# Patient Record
Sex: MALE | Race: WHITE | NOT HISPANIC OR LATINO | URBAN - METROPOLITAN AREA
[De-identification: names, ages, dates, MRNs, and addresses within clinical notes are randomized per-mention and may not be internally consistent; named-entity substitution may affect disease eponyms.]

---

## 2017-03-03 ENCOUNTER — ALLSCRIPTS OFFICE VISIT (OUTPATIENT)
Dept: OTHER | Facility: OTHER | Age: 62
End: 2017-03-03

## 2017-03-22 ENCOUNTER — DOCTOR'S OFFICE (OUTPATIENT)
Dept: URBAN - METROPOLITAN AREA CLINIC 136 | Facility: CLINIC | Age: 62
Setting detail: OPHTHALMOLOGY
End: 2017-03-22
Payer: COMMERCIAL

## 2017-03-22 DIAGNOSIS — H40.003: ICD-10-CM

## 2017-03-22 DIAGNOSIS — H35.361: ICD-10-CM

## 2017-03-22 DIAGNOSIS — Z96.1: ICD-10-CM

## 2017-03-22 PROCEDURE — 92014 COMPRE OPH EXAM EST PT 1/>: CPT | Performed by: OPTOMETRIST

## 2017-03-22 ASSESSMENT — REFRACTION_MANIFEST
OS_VA2: 20/
OD_VA2: 20/
OS_VA3: 20/
OS_VA3: 20/
OD_VA3: 20/
OD_VA2: 20/
OU_VA: 20/20
OS_VA1: 20/20
OD_AXIS: 160
OD_VA3: 20/
OD_SPHERE: +2.50
OS_CYLINDER: -0.50
OS_VA2: 20/
OD_CYLINDER: -0.50
OD_VA1: 20/
OS_AXIS: 060
OD_VA1: 20/20
OS_VA1: 20/
OS_SPHERE: +2.25
OU_VA: 20/

## 2017-03-22 ASSESSMENT — SUPERFICIAL PUNCTATE KERATITIS (SPK)
OS_SPK: 1+
OD_SPK: T

## 2017-03-22 ASSESSMENT — KERATOMETRY
METHOD_AUTO_MANUAL: MANUAL
OS_K2POWER_DIOPTERS: 44.50
OD_K1POWER_DIOPTERS: 43.50
OS_K1POWER_DIOPTERS: 43.75
OS_AXISANGLE_DEGREES: 020
OD_AXISANGLE_DEGREES: 010
OD_K2POWER_DIOPTERS: 44.50

## 2017-03-22 ASSESSMENT — SPHEQUIV_DERIVED
OD_SPHEQUIV: 2.25
OS_SPHEQUIV: 2
OS_SPHEQUIV: 0

## 2017-03-22 ASSESSMENT — REFRACTION_AUTOREFRACTION
OD_SPHERE: PLANO
OD_CYLINDER: -0.25
OS_SPHERE: +0.25
OS_AXIS: 033
OD_AXIS: 147
OS_CYLINDER: -0.50

## 2017-03-22 ASSESSMENT — REFRACTION_OUTSIDERX
OS_VA1: 20/20
OD_AXIS: 165
OS_SPHERE: +0.25
OD_VA3: 20/
OS_VA3: 20/
OD_CYLINDER: -0.50
OU_VA: 20/20
OD_ADD: +PLANO
OD_VA2: 20/20
OS_VA2: 20/20
OD_SPHERE: +0.25
OS_AXIS: 030
OS_ADD: +PLANO
OD_VA1: 20/20
OS_CYLINDER: -0.50

## 2017-03-22 ASSESSMENT — REFRACTION_CURRENTRX
OD_OVR_VA: 20/
OD_ADD: +2.00
OS_ADD: +2.00
OS_OVR_VA: 20/
OS_SPHERE: +1.50
OD_CYLINDER: -0.25
OS_OVR_VA: 20/
OD_AXIS: 071
OS_OVR_VA: 20/
OS_CYLINDER: -0.25
OD_OVR_VA: 20/
OD_OVR_VA: 20/
OS_AXIS: 050
OD_SPHERE: +1.75

## 2017-03-22 ASSESSMENT — LID EXAM ASSESSMENTS
OS_MEIBOMITIS: 2+
OD_MEIBOMITIS: 2+

## 2017-03-22 ASSESSMENT — AXIALLENGTH_DERIVED
OD_AL: 22.5772
OS_AL: 23.3645
OS_AL: 22.6245

## 2017-03-22 ASSESSMENT — CONFRONTATIONAL VISUAL FIELD TEST (CVF)
OD_FINDINGS: FULL
OS_FINDINGS: FULL

## 2017-03-22 ASSESSMENT — VISUAL ACUITY
OD_BCVA: 20/20-
OS_BCVA: 20/20-

## 2017-03-22 ASSESSMENT — TEAR BREAK UP TIME (TBUT)
OS_TBUT: 1+
OD_TBUT: 1+

## 2017-03-22 ASSESSMENT — DECREASING TEAR LAKE - SEVERITY SCORE
OS_DEC_TEARLAKE: 1+
OD_DEC_TEARLAKE: 1+

## 2017-08-18 ENCOUNTER — ALLSCRIPTS OFFICE VISIT (OUTPATIENT)
Dept: OTHER | Facility: OTHER | Age: 62
End: 2017-08-18

## 2017-10-06 ENCOUNTER — ALLSCRIPTS OFFICE VISIT (OUTPATIENT)
Dept: OTHER | Facility: OTHER | Age: 62
End: 2017-10-06

## 2018-01-11 NOTE — PSYCH
Psych Med Mgmt    Appearance: was calm and cooperative  Observed mood: mood appropriate  Observed mood: affect appropriate  Speech: a normal rate  Thought processes: coherent/organized  Hallucinations: no hallucinations present  Thought Content: no delusions  Abnormal Thoughts: The patient has no suicidal thoughts and no homicidal thoughts  Orientation: The patient is oriented to person, place and time  Recent and Remote Memory: short term memory intact and long term memory intact  Treatment Recommendations: Zoloft 100 mg po qd    Letter sent to   Risks, Benefits And Possible Side Effects Of Medications: Risks, benefits, and possible side effects of medications explained to patient and patient verbalizes understanding  He reports normal appetite, normal energy level, no weight change and normal number of sleep hours  Pt seen for follow up MDD  Pt states he is dong well and had eye surgery last fall and can see well  He also had knee replacement and and recuperating well from that  His mother unfortunately had a fall and pelvic fracture and is in hospital currently  He has been not working but at [employer] and looking for work  Sleeping well and appetite is good  He is watching his diet and following a low carb diet to lose weight  He is doing well with that and plans to continue to lose weight  He is overall at his baseline with his moods  No side effects with meds  He is still unable to access internet due to his legal issues  He continues to go to therapy in [place]  Vitals  Signs   Recorded: 02Qhl9499 11:17AM   Respiration: 16  Height: 6 ft 5 in  Weight: 301 lb   BMI Calculated: 35 69  BSA Calculated: 2 66    DSM    Provisional Diagnosis: MDD  Assessment    1  Chronic recurrent major depressive disorder (296 30) (F33 9)    Review of Systems    Constitutional: No fever, no chills, feels well, no tiredness, no recent weight gain or loss  Cardiovascular: no complaints of slow or fast heart rate, no chest pain, no palpitations  Respiratory: no complaints of shortness of breath, no wheezing, no dyspnea on exertion  Gastrointestinal: no complaints of abdominal pain, no constipation, no nausea, no diarrhea, no vomiting  Genitourinary: no complaints of dysuria, no incontinence, no pelvic pain, no urinary frequency  Musculoskeletal: no complaints of arthralgia, no myalgias, no limb pain, no joint stiffness  Integumentary: no complaints of skin rash, no itching, no dry skin  Neurological: no complaints of headache, no confusion, no numbness, no dizziness  Active Problems    1  Chronic recurrent major depressive disorder (296 30) (F33 9)    Past Medical History    1  History of sleep apnea (V13 89) (Z86 69)    Allergies    1  No Known Drug Allergies    Current Meds   1  Pantoprazole Sodium 40 MG Oral Tablet Delayed Release; Therapy: (Recorded:06May2016) to Recorded   2  Sertraline HCl - 100 MG Oral Tablet; 1 TAB PO QD  Requested for: 33YZI5966; Last   Rx:[id number] Ordered    The medication list was reviewed and updated today  Family Psych History  Mother    1  No pertinent family history    Social History    · Lives with parents   · Never a smoker   · No alcohol use  The social history was reviewed and is unchanged  End of Encounter Meds    1  Sertraline HCl - 100 MG Oral Tablet; 1 TAB PO QD  Requested for: 31WRZ9120; Last   Rx:[id number] Ordered    2  Pantoprazole Sodium 40 MG Oral Tablet Delayed Release; Therapy: (Recorded:26Cjp1750) to Recorded    Signatures   Electronically signed by : Mini Brambila, Tabitha Bello;  Aug 18 2017 11:34AM EST                       (Author)    Electronically signed by : Jan Hale MD; Aug 21 2017  3:14PM EST

## 2018-01-12 NOTE — PSYCH
Psych Med Mgmt    Appearance: was calm and cooperative  Observed mood: mood appropriate  Observed mood: affect was constricted  Speech: a normal rate  Thought processes: coherent/organized  Hallucinations: no hallucinations present  Thought Content: no delusions  Abnormal Thoughts: The patient has no suicidal thoughts and no homicidal thoughts  Orientation: The patient is oriented to person, place and time  Recent and Remote Memory: short term memory intact and long term memory intact  Attention Span And Concentration: concentration intact  Insight: Insight intact  Treatment Recommendations: Zoloft 100 mg po qd  Risks, Benefits And Possible Side Effects Of Medications: Risks, benefits, and possible side effects of medications explained to patient and patient verbalizes understanding  He reports normal appetite, normal energy level, no weight change and normal number of sleep hours  Pt seen for follow up- he seems at his baseline  He states the last week has been difficult due to weather and his daughters 22nd birthday this week- he has not had contact with her since she was 12  His parents health has been deteriorating- his mother had a fall yesterday and had to get 10 stitches  His work has been stable- some stressors there but overall seems to be doing well  He is sleeping well and watching diet and has lost a few pounds  Vitals  Signs [Data Includes: Current Encounter]   Recorded: [id number] 11:51AM   Heart Rate: 64  Systolic: 038  Diastolic: 82  Recorded: 85PKB0353 11:49AM   Respiration: 16  Recorded: 27KXP2960 11:47AM   Height: 6 ft 6 in  Weight: 297 lb   BMI Calculated: 34 32  BSA Calculated: 2 67    DSM    Provisional Diagnosis: Chronic Major Recurrent Depression  Assessment    1  Chronic recurrent major depressive disorder (296 30) (F33 9)    Plan    1   Sertraline HCl - 100 MG Oral Tablet; 1 TAB PO QD    Review of Systems    Constitutional: No fever, no chills, feels well, no tiredness, no recent weight gain or loss  Cardiovascular: no complaints of slow or fast heart rate, no chest pain, no palpitations  Respiratory: no complaints of shortness of breath, no wheezing, no dyspnea on exertion  Gastrointestinal: no complaints of abdominal pain, no constipation, no nausea, no diarrhea, no vomiting  Genitourinary: no complaints of dysuria, no incontinence, no pelvic pain, no urinary frequency  Musculoskeletal: no complaints of arthralgia, no myalgias, no limb pain, no joint stiffness  Integumentary: no complaints of skin rash, no itching, no dry skin  Neurological: no complaints of headache, no confusion, no numbness, no dizziness  Active Problems    1  Chronic recurrent major depressive disorder (296 30) (F33 9)    Allergies    1  No Known Drug Allergies    Current Meds   1  Pantoprazole Sodium 40 MG Oral Tablet Delayed Release; Therapy: (Recorded:95Auc7439) to Recorded   2  Sertraline HCl - 100 MG Oral Tablet; 1 TAB PO QD  Requested for: 65WAZ1852; Last   Rx:[id number] Ordered    The medication list was reviewed and updated today  Family Psych History  Mother    1  No pertinent family history    The family history was reviewed and updated today  Social History    · Never a smoker   · No alcohol use  The social history was reviewed and updated today  The social history was reviewed and is unchanged  End of Encounter Meds    1  Sertraline HCl - 100 MG Oral Tablet; 1 TAB PO QD  Requested for: 44IUN8177; Last   DZ:[id number] Ordered    2  Pantoprazole Sodium 40 MG Oral Tablet Delayed Release;    Therapy: (Recorded:57Eby6658) to Recorded    Signatures   Electronically signed by : Bruce Garay, [facility]; May  6 2016 11:56AM EST                       (Author)    Electronically signed by : Pretty Foy MD; May  9 2016  5:08PM EST

## 2018-01-13 VITALS — RESPIRATION RATE: 16 BRPM | BODY MASS INDEX: 35.54 KG/M2 | WEIGHT: 301 LBS | HEIGHT: 77 IN

## 2018-01-13 NOTE — PSYCH
Psych Med Mgmt    Appearance: was calm and cooperative  Observed mood: mood appropriate  Observed mood: affect appropriate  Speech: a normal rate  Thought processes: coherent/organized  Hallucinations: no hallucinations present  Thought Content: no delusions  Abnormal Thoughts: The patient has no suicidal thoughts and no homicidal thoughts  Orientation: The patient is oriented to person, place and time  Recent and Remote Memory: short term memory intact and long term memory intact  Attention Span And Concentration: concentration intact  Insight: Insight intact  Judgment: His judgment was intact  Muscle Strength And Tone  Normal gait and station  Treatment Recommendations: Zoloft 100 mg po qd  Risks, Benefits And Possible Side Effects Of Medications: Risks, benefits, and possible side effects of medications explained to patient and patient verbalizes understanding  He reports normal appetite, normal energy level, no weight change and normal number of sleep hours  Pt states he has been doing well off Risperdal  Initially he had some dizziness of Risperdal but this resolved in 1 week  Work has been stable- he was working a lot of hours before holiday  Sleeping well  Appetite good  Stable relationship with his parents  Vitals  Signs [Data Includes: Current Encounter]   Recorded: 41OZW1486 11:45AM   Height: 6 ft 6 in  Weight: 308 lb   BMI Calculated: 35 59  BSA Calculated: 2 71    DSM    Provisional Diagnosis: Major Recurrent Depression  Assessment    1  Depression, major, recurrent (296 30) (F33 9)    Plan    1  RisperiDONE 0 5 MG Oral Tablet   2  Sertraline HCl - 100 MG Oral Tablet; 1 TAB PO QD    Review of Systems    Constitutional: No fever, no chills, feels well, no tiredness, no recent weight gain or loss  Cardiovascular: no complaints of slow or fast heart rate, no chest pain, no palpitations     Respiratory: no complaints of shortness of breath, no wheezing, no dyspnea on exertion  Gastrointestinal: no complaints of abdominal pain, no constipation, no nausea, no diarrhea, no vomiting  Genitourinary: no complaints of dysuria, no incontinence, no pelvic pain, no urinary frequency  Musculoskeletal: no complaints of arthralgia, no myalgias, no limb pain, no joint stiffness  Integumentary: no complaints of skin rash, no itching, no dry skin  Active Problems    1  Depression, major, recurrent (296 30) (F33 9)    Allergies    1  No Known Drug Allergies    Current Meds   1  RisperiDONE 0 5 MG Oral Tablet; take 1 tablet at bedtime  Requested for: 40Grn9268; Last Rx:[id number] Ordered   2  Sertraline HCl - 100 MG Oral Tablet; 1 TAB PO QD  Requested for: 81WEQ0735; Last   Rx:[id number] Ordered    The medication list was reviewed and updated today  Family Psych History    1  No pertinent family history    The family history was reviewed and updated today  Social History    · Never a smoker   · No alcohol use  The social history was reviewed and updated today  The social history was reviewed and is unchanged  End of Encounter Meds    1   Sertraline HCl - 100 MG Oral Tablet; 1 TAB PO QD  Requested for: 97TNC8265; Last   BQ:[id number] Ordered    Signatures   Electronically signed by : Sukhwinder Gonzalez, [facility]; Jan 29 2016 11:47AM EST                       (Author)    Electronically signed by : Dorothy Simon MD; Feb 1 2016  3:34PM EST

## 2018-01-15 ENCOUNTER — DOCTOR'S OFFICE (OUTPATIENT)
Dept: URBAN - METROPOLITAN AREA CLINIC 136 | Facility: CLINIC | Age: 63
Setting detail: OPHTHALMOLOGY
End: 2018-01-15
Payer: COMMERCIAL

## 2018-01-15 DIAGNOSIS — H40.003: ICD-10-CM

## 2018-01-15 DIAGNOSIS — Z96.1: ICD-10-CM

## 2018-01-15 DIAGNOSIS — H35.361: ICD-10-CM

## 2018-01-15 DIAGNOSIS — H43.811: ICD-10-CM

## 2018-01-15 DIAGNOSIS — H52.13: ICD-10-CM

## 2018-01-15 PROCEDURE — 92014 COMPRE OPH EXAM EST PT 1/>: CPT | Performed by: OPHTHALMOLOGY

## 2018-01-15 ASSESSMENT — REFRACTION_MANIFEST
OD_CYLINDER: -0.50
OU_VA: 20/20
OS_AXIS: 060
OS_VA2: 20/
OS_VA2: 20/
OD_VA2: 20/
OD_VA1: 20/
OS_VA3: 20/
OD_VA1: 20/20
OD_SPHERE: +2.50
OS_CYLINDER: -0.50
OD_VA2: 20/
OD_VA3: 20/
OU_VA: 20/
OD_AXIS: 160
OS_SPHERE: +2.25
OD_VA3: 20/
OS_VA3: 20/
OS_VA1: 20/20
OS_VA1: 20/

## 2018-01-15 ASSESSMENT — TEAR BREAK UP TIME (TBUT)
OD_TBUT: 1+
OS_TBUT: 1+

## 2018-01-15 ASSESSMENT — REFRACTION_OUTSIDERX
OS_VA1: 20/20
OD_VA2: 20/20
OD_SPHERE: +0.25
OS_VA2: 20/20
OS_ADD: +PLANO
OS_VA3: 20/
OS_SPHERE: +0.25
OD_AXIS: 165
OS_AXIS: 030
OU_VA: 20/20
OD_VA3: 20/
OD_ADD: +PLANO
OD_VA1: 20/20
OD_CYLINDER: -0.50
OS_CYLINDER: -0.50

## 2018-01-15 ASSESSMENT — REFRACTION_AUTOREFRACTION
OS_AXIS: 053
OS_SPHERE: +0.25
OD_AXIS: 0
OD_CYLINDER: PLANO
OD_SPHERE: PLANO
OS_CYLINDER: -0.25

## 2018-01-15 ASSESSMENT — VISUAL ACUITY
OS_BCVA: 20/20
OD_BCVA: 20/20

## 2018-01-15 ASSESSMENT — SPHEQUIV_DERIVED
OD_SPHEQUIV: 2.25
OS_SPHEQUIV: 0.125
OS_SPHEQUIV: 2

## 2018-01-15 ASSESSMENT — REFRACTION_CURRENTRX
OD_CYLINDER: -0.25
OS_OVR_VA: 20/
OD_OVR_VA: 20/
OD_SPHERE: +1.75
OS_ADD: +2.00
OS_CYLINDER: -0.25
OS_AXIS: 050
OD_OVR_VA: 20/
OD_AXIS: 071
OD_ADD: +2.00
OS_SPHERE: +1.50
OS_OVR_VA: 20/
OS_OVR_VA: 20/
OD_OVR_VA: 20/

## 2018-01-15 ASSESSMENT — DECREASING TEAR LAKE - SEVERITY SCORE
OD_DEC_TEARLAKE: 1+
OS_DEC_TEARLAKE: 1+

## 2018-01-15 ASSESSMENT — KERATOMETRY
OD_K2POWER_DIOPTERS: 44.50
OS_AXISANGLE_DEGREES: 020
OS_K2POWER_DIOPTERS: 44.50
OD_AXISANGLE_DEGREES: 010
OD_K1POWER_DIOPTERS: 43.50
METHOD_AUTO_MANUAL: MANUAL
OS_K1POWER_DIOPTERS: 43.75

## 2018-01-15 ASSESSMENT — LID EXAM ASSESSMENTS
OS_MEIBOMITIS: 2+
OD_MEIBOMITIS: 2+

## 2018-01-15 ASSESSMENT — AXIALLENGTH_DERIVED
OS_AL: 23.3169
OD_AL: 22.5772
OS_AL: 22.6245

## 2018-01-15 ASSESSMENT — SUPERFICIAL PUNCTATE KERATITIS (SPK)
OD_SPK: T
OS_SPK: 1+

## 2018-01-15 ASSESSMENT — CONFRONTATIONAL VISUAL FIELD TEST (CVF)
OS_FINDINGS: FULL
OD_FINDINGS: FULL

## 2018-01-16 NOTE — PSYCH
Psych Med Mgmt    Appearance: was calm and cooperative  Observed mood: mood appropriate  Observed mood: affect appropriate  Speech: a normal rate  Thought processes: coherent/organized  Hallucinations: no hallucinations present  Thought Content: no delusions  Abnormal Thoughts: The patient has no suicidal thoughts and no homicidal thoughts  Orientation: The patient is oriented to person, place and time  Recent and Remote Memory: short term memory intact  Attention Span And Concentration: concentration intact  Insight: Insight intact  Judgment: His judgment was intact  Muscle Strength And Tone  Normal gait and station  Treatment Recommendations: Sertraline 100 mg po qd  He reports normal appetite, normal energy level, no weight change and normal number of sleep hours  Pt seen for follow up Major Depression  Pt had eye lens surgery yesterday and is recuperating well  Sleeping well about 8- 9 hours  Appetite is good  He has been trying to eat healthier  Pt states he is overall feeling stable in his moods  He is feeling well physically other than his eye  Work has been stable- states he does not have as much overtime which is good  His parents are "getting older' so having more medical issues  They are 87 and 83  Vitals  Signs   Recorded: 73OWB5190 91:04DN   Systolic: 222  Diastolic: 76  Heart Rate: 80  Recorded: 30EWD3188 02:10PM   Respiration: 16  Height: 6 ft 6 in  Weight: 297 lb   BMI Calculated: 34 32  BSA Calculated: 2 67    DSM    Provisional Diagnosis: Chronic Major Depression  Assessment    1  Chronic recurrent major depressive disorder (296 30) (F33 9)    Plan    1  Sertraline HCl - 100 MG Oral Tablet; 1 TAB PO QD    Review of Systems    Constitutional: No fever, no chills, feels well, no tiredness, no recent weight gain or loss  Cardiovascular: no complaints of slow or fast heart rate, no chest pain, no palpitations     Respiratory: no complaints of shortness of breath, no wheezing, no dyspnea on exertion  Gastrointestinal: no complaints of abdominal pain, no constipation, no nausea, no diarrhea, no vomiting  Genitourinary: no complaints of dysuria, no incontinence, no pelvic pain, no urinary frequency  Musculoskeletal: no complaints of arthralgia, no myalgias, no limb pain, no joint stiffness  Integumentary: no complaints of skin rash, no itching, no dry skin  Neurological: no complaints of headache, no confusion, no numbness, no dizziness  Active Problems    1  Chronic recurrent major depressive disorder (296 30) (F33 9)    Past Medical History    The active problems and past medical history were reviewed and updated today  Allergies    1  No Known Drug Allergies    Current Meds   1  Pantoprazole Sodium 40 MG Oral Tablet Delayed Release; Therapy: (Recorded:98Vbw9073) to Recorded   2  Sertraline HCl - 100 MG Oral Tablet; 1 TAB PO QD  Requested for: 48FEO0904; Last   DO:[id number] Ordered    The medication list was reviewed and updated today  Family Psych History  Mother    1  No pertinent family history    The family history was reviewed and updated today  Social History    · Never a smoker   · No alcohol use  The social history was reviewed and updated today  The social history was reviewed and is unchanged  End of Encounter Meds    1  Sertraline HCl - 100 MG Oral Tablet; 1 TAB PO QD  Requested for: 04OIH2873; Last   Rx:[id number] Ordered    2  Pantoprazole Sodium 40 MG Oral Tablet Delayed Release;    Therapy: (Recorded:53Ypn7834) to Recorded    Signatures   Electronically signed by : Curly Del Castillo [facility]; Sep 23 2016  2:16PM EST                       (Author)    Electronically signed by : Curly Del Castillo [facility]; Sep 23 2016  2:24PM EST                       (Author)    Electronically signed by : Louise Hong MD; Sep 26 2016  2:57PM EST

## 2018-01-18 NOTE — PSYCH
Psych Med Mgmt    Appearance: was calm and cooperative, adequate hygiene and grooming and good eye contact  Observed mood: mood appropriate  Observed mood: affect appropriate  Speech: a normal rate  Thought processes: coherent/organized  Hallucinations: no hallucinations present  Thought Content: no delusions  Abnormal Thoughts: The patient has death wish, but no homicidal thoughts  Orientation: The patient is oriented to person, place and time  Recent and Remote Memory: short term memory intact and long term memory intact  Attention Span And Concentration: concentration intact  Insight: Insight intact  Judgment: His judgment was intact  Muscle Strength And Tone  Normal gait and station  Individual Psychotherapy minutes provided today  Goals [facility] in [facility]   residual symptoms and treatment options       Treatment Recommendations: Continue Sertraline 100 mg po qd    Referral to [address] [place] [address]  Risks, Benefits And Possible Side Effects Of Medications: Risks, benefits, and possible side effects of medications explained to patient and patient verbalizes understanding  He reports normal appetite, normal energy level, no weight change and normal number of sleep hours  Pt seen as emergency visit  Pt states he was feeling more depressed last week and having suicidal ideation  He states his depression was off and on the past week  He continues to live at his parents house  He states he has continues to struggle with his inability to use a computer or use the internet due to his probation  He states he had a meeting with an  about this because he read about barring somebody to use social medica was unconstitutional  He had a meeting with an  last week in [place] about this and depression started after this  States the  was verbally aggressive and felt demeaning  This upset him and cause his depression to worsen      Pt sates he is feeling a little better and denies suicidal ideation  He states he has no plan to harm self  He sates he felt like he had a heavy weight in his head  No hallucinations  No HI  States he has been getting out and able to keep busy  They celebrated his mother's 88th birthday earlier this week  He states he did had ECT in the past which was helpful  Although he reports feeling slightly better he feels as though the depression is still concerning  He is not interested in a medication adjustment  He is interested in [address] [place] [address] and does not want to take any different meds  He has been compliant with his zoloft  His sleep and appetite have not been disrupted  He did contact Dr Gabi Packer and has an appointment 10/13/17  He has been going [employer] and has been out of work since December  Looking into career as a   No new meds or medical issues  Feels well physically  Vitals  Signs   Recorded: 62TVS4370 11:19AM   Respiration: 16  Height: 6 ft 5 in  Weight: 285 lb   BMI Calculated: 33 8  BSA Calculated: 2 6    DSM    Provisional Diagnosis: MDD  Assessment    1  Chronic recurrent major depressive disorder (296 30) (F33 9)    Plan    1  Sertraline HCl - 100 MG Oral Tablet; 1 TAB PO QD    Review of Systems    Constitutional: No fever, no chills, feels well, no tiredness, no recent weight gain or loss  Cardiovascular: no complaints of slow or fast heart rate, no chest pain, no palpitations  Respiratory: no complaints of shortness of breath, no wheezing, no dyspnea on exertion  Gastrointestinal: no complaints of abdominal pain, no constipation, no nausea, no diarrhea, no vomiting  Genitourinary: no complaints of dysuria, no incontinence, no pelvic pain, no urinary frequency  Musculoskeletal: no complaints of arthralgia, no myalgias, no limb pain, no joint stiffness  Integumentary: no complaints of skin rash, no itching, no dry skin  Neurological: no complaints of headache, no confusion, no numbness, no dizziness  Active Problems    1  Chronic recurrent major depressive disorder (296 30) (F33 9)    Past Medical History    1  History of sleep apnea (V13 89) (Z86 69)    Allergies    1  No Known Drug Allergies    Current Meds   1  Pantoprazole Sodium 40 MG Oral Tablet Delayed Release; Therapy: (Recorded:99Whj6387) to Recorded   2  Sertraline HCl - 100 MG Oral Tablet; 1 TAB PO QD  Requested for: 08OZE6681; Last   Rx:[id number] Ordered    The medication list was reviewed and updated today  Family Psych History  Mother    1  No pertinent family history    Social History    · Lives with parents   · Never a smoker   · No alcohol use  The social history was reviewed and is unchanged  End of Encounter Meds    1  Sertraline HCl - 100 MG Oral Tablet; 1 TAB PO QD  Requested for: 70ESB0987; Last   Rx:[id number] Ordered    2  Pantoprazole Sodium 40 MG Oral Tablet Delayed Release;    Therapy: (Recorded:09Wwe2234) to Recorded    Future Appointments    Date/Time Provider Specialty Site   02/02/2018 10:30 AM Donell Fuller [address]     Signatures   Electronically signed by : Rashaun Monroy, [facility]; Oct  6 2017 11:45AM EST                       (Author)    Electronically signed by : Venita Cervantes MD; Oct 23 2017  8:05AM EST

## 2018-01-22 VITALS
BODY MASS INDEX: 35.42 KG/M2 | DIASTOLIC BLOOD PRESSURE: 84 MMHG | HEART RATE: 82 BPM | SYSTOLIC BLOOD PRESSURE: 125 MMHG | HEIGHT: 77 IN | WEIGHT: 300 LBS | RESPIRATION RATE: 16 BRPM

## 2018-01-22 VITALS — HEIGHT: 77 IN | RESPIRATION RATE: 16 BRPM | BODY MASS INDEX: 33.65 KG/M2 | WEIGHT: 285 LBS

## 2018-01-26 RX ORDER — SERTRALINE HYDROCHLORIDE 100 MG/1
TABLET, FILM COATED ORAL
Qty: 90 TABLET | Refills: 1 | OUTPATIENT
Start: 2018-01-26

## 2018-02-01 ENCOUNTER — RX ONLY (RX ONLY)
Age: 63
End: 2018-02-01

## 2018-02-01 ENCOUNTER — DOCTOR'S OFFICE (OUTPATIENT)
Dept: URBAN - METROPOLITAN AREA CLINIC 136 | Facility: CLINIC | Age: 63
Setting detail: OPHTHALMOLOGY
End: 2018-02-01
Payer: COMMERCIAL

## 2018-02-01 DIAGNOSIS — H40.013: ICD-10-CM

## 2018-02-01 DIAGNOSIS — H43.811: ICD-10-CM

## 2018-02-01 DIAGNOSIS — H52.13: ICD-10-CM

## 2018-02-01 DIAGNOSIS — Z96.1: ICD-10-CM

## 2018-02-01 DIAGNOSIS — H35.61: ICD-10-CM

## 2018-02-01 DIAGNOSIS — H35.3111: ICD-10-CM

## 2018-02-01 PROCEDURE — 92014 COMPRE OPH EXAM EST PT 1/>: CPT | Performed by: OPHTHALMOLOGY

## 2018-02-01 PROCEDURE — 92134 CPTRZ OPH DX IMG PST SGM RTA: CPT | Performed by: OPHTHALMOLOGY

## 2018-02-01 ASSESSMENT — TEAR BREAK UP TIME (TBUT)
OS_TBUT: 1+
OD_TBUT: 1+

## 2018-02-01 ASSESSMENT — LID EXAM ASSESSMENTS
OS_MEIBOMITIS: 2+
OD_MEIBOMITIS: 2+

## 2018-02-01 ASSESSMENT — DECREASING TEAR LAKE - SEVERITY SCORE
OS_DEC_TEARLAKE: 1+
OD_DEC_TEARLAKE: 1+

## 2018-02-01 ASSESSMENT — CONFRONTATIONAL VISUAL FIELD TEST (CVF)
OS_FINDINGS: FULL
OD_FINDINGS: FULL

## 2018-02-01 ASSESSMENT — SUPERFICIAL PUNCTATE KERATITIS (SPK)
OD_SPK: T
OS_SPK: 1+

## 2018-02-06 ASSESSMENT — REFRACTION_CURRENTRX
OS_AXIS: 050
OS_OVR_VA: 20/
OS_ADD: +2.00
OD_SPHERE: +1.75
OS_OVR_VA: 20/
OD_CYLINDER: -0.25
OD_AXIS: 071
OD_OVR_VA: 20/
OD_OVR_VA: 20/
OD_ADD: +2.00
OD_OVR_VA: 20/
OS_OVR_VA: 20/
OS_CYLINDER: -0.25
OS_SPHERE: +1.50

## 2018-02-06 ASSESSMENT — REFRACTION_AUTOREFRACTION
OD_CYLINDER: PLANO
OS_SPHERE: +0.25
OS_CYLINDER: -0.25
OS_AXIS: 053
OD_AXIS: 0
OD_SPHERE: PLANO

## 2018-02-06 ASSESSMENT — REFRACTION_MANIFEST
OS_VA2: 20/
OD_AXIS: 160
OS_VA2: 20/
OS_CYLINDER: -0.50
OD_VA2: 20/
OS_VA3: 20/
OD_VA1: 20/20
OD_VA3: 20/
OU_VA: 20/
OD_VA3: 20/
OS_SPHERE: +2.25
OD_CYLINDER: -0.50
OS_VA1: 20/
OD_VA2: 20/
OD_VA1: 20/
OS_VA3: 20/
OU_VA: 20/20
OS_VA1: 20/20
OS_AXIS: 060
OD_SPHERE: +2.50

## 2018-02-06 ASSESSMENT — REFRACTION_OUTSIDERX
OS_AXIS: 030
OS_VA3: 20/
OS_SPHERE: +0.25
OS_ADD: +PLANO
OD_VA3: 20/
OD_VA1: 20/20
OS_VA1: 20/20
OS_CYLINDER: -0.50
OD_VA2: 20/20
OD_SPHERE: +0.25
OD_ADD: +PLANO
OD_AXIS: 165
OD_CYLINDER: -0.50
OU_VA: 20/20
OS_VA2: 20/20

## 2018-02-06 ASSESSMENT — SPHEQUIV_DERIVED
OD_SPHEQUIV: 2.25
OS_SPHEQUIV: 2
OS_SPHEQUIV: 0.125

## 2018-02-06 ASSESSMENT — VISUAL ACUITY
OD_BCVA: 20/25+3
OS_BCVA: 20/25

## 2018-03-13 ENCOUNTER — DOCTOR'S OFFICE (OUTPATIENT)
Dept: URBAN - METROPOLITAN AREA CLINIC 136 | Facility: CLINIC | Age: 63
Setting detail: OPHTHALMOLOGY
End: 2018-03-13
Payer: COMMERCIAL

## 2018-03-13 DIAGNOSIS — Z96.1: ICD-10-CM

## 2018-03-13 DIAGNOSIS — H40.013: ICD-10-CM

## 2018-03-13 DIAGNOSIS — H35.3112: ICD-10-CM

## 2018-03-13 DIAGNOSIS — H43.811: ICD-10-CM

## 2018-03-13 PROCEDURE — 92134 CPTRZ OPH DX IMG PST SGM RTA: CPT | Performed by: OPHTHALMOLOGY

## 2018-03-13 PROCEDURE — 92012 INTRM OPH EXAM EST PATIENT: CPT | Performed by: OPHTHALMOLOGY

## 2018-03-13 ASSESSMENT — VISUAL ACUITY
OS_BCVA: 20/20
OD_BCVA: 20/25+3

## 2018-03-13 ASSESSMENT — REFRACTION_MANIFEST
OU_VA: 20/
OS_VA1: 20/20
OD_VA3: 20/
OD_VA2: 20/
OD_VA3: 20/
OS_VA1: 20/
OD_AXIS: 160
OD_SPHERE: +2.50
OD_VA1: 20/20
OS_SPHERE: +2.25
OS_VA2: 20/
OU_VA: 20/20
OS_VA3: 20/
OD_VA1: 20/
OS_VA3: 20/
OS_CYLINDER: -0.50
OS_AXIS: 060
OS_VA2: 20/
OD_CYLINDER: -0.50
OD_VA2: 20/

## 2018-03-13 ASSESSMENT — REFRACTION_OUTSIDERX
OS_CYLINDER: -0.50
OS_ADD: +PLANO
OS_VA2: 20/20
OD_CYLINDER: -0.50
OS_VA1: 20/20
OD_VA3: 20/
OD_SPHERE: +0.25
OD_VA1: 20/20
OS_SPHERE: +0.25
OD_AXIS: 165
OS_VA3: 20/
OU_VA: 20/20
OS_AXIS: 030
OD_ADD: +PLANO
OD_VA2: 20/20

## 2018-03-13 ASSESSMENT — TEAR BREAK UP TIME (TBUT)
OS_TBUT: 1+
OD_TBUT: 1+

## 2018-03-13 ASSESSMENT — CONFRONTATIONAL VISUAL FIELD TEST (CVF)
OS_FINDINGS: FULL
OD_FINDINGS: FULL

## 2018-03-13 ASSESSMENT — SUPERFICIAL PUNCTATE KERATITIS (SPK)
OD_SPK: T
OS_SPK: 1+

## 2018-03-13 ASSESSMENT — LID EXAM ASSESSMENTS
OS_MEIBOMITIS: 2+
OD_MEIBOMITIS: 2+

## 2018-03-13 ASSESSMENT — SPHEQUIV_DERIVED
OS_SPHEQUIV: 0.125
OD_SPHEQUIV: 2.25
OS_SPHEQUIV: 2

## 2018-03-13 ASSESSMENT — REFRACTION_AUTOREFRACTION
OS_AXIS: 053
OD_AXIS: 0
OD_SPHERE: PLANO
OD_CYLINDER: PLANO
OS_SPHERE: +0.25
OS_CYLINDER: -0.25

## 2018-03-13 ASSESSMENT — REFRACTION_CURRENTRX
OD_SPHERE: +1.75
OD_AXIS: 071
OS_CYLINDER: -0.25
OS_OVR_VA: 20/
OS_SPHERE: +1.50
OS_AXIS: 050
OS_OVR_VA: 20/
OS_ADD: +2.00
OD_OVR_VA: 20/
OS_OVR_VA: 20/
OD_OVR_VA: 20/
OD_OVR_VA: 20/
OD_ADD: +2.00
OD_CYLINDER: -0.25

## 2018-03-13 ASSESSMENT — DECREASING TEAR LAKE - SEVERITY SCORE
OD_DEC_TEARLAKE: 1+
OS_DEC_TEARLAKE: 1+

## 2018-07-10 ENCOUNTER — DOCTOR'S OFFICE (OUTPATIENT)
Dept: URBAN - METROPOLITAN AREA CLINIC 136 | Facility: CLINIC | Age: 63
Setting detail: OPHTHALMOLOGY
End: 2018-07-10
Payer: COMMERCIAL

## 2018-07-10 DIAGNOSIS — H43.811: ICD-10-CM

## 2018-07-10 DIAGNOSIS — H35.3112: ICD-10-CM

## 2018-07-10 DIAGNOSIS — H40.003: ICD-10-CM

## 2018-07-10 DIAGNOSIS — Z96.1: ICD-10-CM

## 2018-07-10 PROCEDURE — 92134 CPTRZ OPH DX IMG PST SGM RTA: CPT | Performed by: OPHTHALMOLOGY

## 2018-07-10 PROCEDURE — 92014 COMPRE OPH EXAM EST PT 1/>: CPT | Performed by: OPHTHALMOLOGY

## 2018-07-10 ASSESSMENT — CONFRONTATIONAL VISUAL FIELD TEST (CVF)
OS_FINDINGS: FULL
OD_FINDINGS: FULL

## 2018-07-10 ASSESSMENT — LID EXAM ASSESSMENTS
OD_MEIBOMITIS: 2+
OS_MEIBOMITIS: 2+

## 2018-07-10 ASSESSMENT — DECREASING TEAR LAKE - SEVERITY SCORE
OD_DEC_TEARLAKE: 1+
OS_DEC_TEARLAKE: 1+

## 2018-07-10 ASSESSMENT — TEAR BREAK UP TIME (TBUT)
OD_TBUT: 1+
OS_TBUT: 1+

## 2018-07-10 ASSESSMENT — SUPERFICIAL PUNCTATE KERATITIS (SPK)
OD_SPK: T
OS_SPK: 1+

## 2018-07-11 ASSESSMENT — REFRACTION_OUTSIDERX
OD_AXIS: 165
OD_VA3: 20/
OD_ADD: +PLANO
OU_VA: 20/20
OD_VA2: 20/20
OS_ADD: +PLANO
OD_CYLINDER: -0.50
OS_AXIS: 030
OD_VA1: 20/20
OS_VA2: 20/20
OD_SPHERE: +0.25
OS_VA1: 20/20
OS_VA3: 20/
OS_CYLINDER: -0.50
OS_SPHERE: +0.25

## 2018-07-11 ASSESSMENT — VISUAL ACUITY
OS_BCVA: 20/20-1
OD_BCVA: 20/20-2

## 2018-07-11 ASSESSMENT — SPHEQUIV_DERIVED
OS_SPHEQUIV: 0.125
OD_SPHEQUIV: 2.25
OS_SPHEQUIV: 2

## 2018-07-11 ASSESSMENT — REFRACTION_MANIFEST
OD_CYLINDER: -0.50
OS_CYLINDER: -0.50
OD_VA2: 20/
OS_VA1: 20/20
OD_AXIS: 160
OS_VA2: 20/
OS_VA1: 20/
OD_VA1: 20/
OS_VA3: 20/
OU_VA: 20/20
OS_AXIS: 060
OD_VA2: 20/
OD_VA1: 20/20
OS_VA3: 20/
OD_VA3: 20/
OU_VA: 20/
OS_VA2: 20/
OD_VA3: 20/
OD_SPHERE: +2.50
OS_SPHERE: +2.25

## 2018-07-11 ASSESSMENT — REFRACTION_CURRENTRX
OD_AXIS: 071
OD_OVR_VA: 20/
OS_ADD: +2.00
OD_SPHERE: +1.75
OS_CYLINDER: -0.25
OS_AXIS: 050
OS_SPHERE: +1.50
OS_OVR_VA: 20/
OD_CYLINDER: -0.25
OD_OVR_VA: 20/
OS_OVR_VA: 20/
OS_OVR_VA: 20/
OD_ADD: +2.00
OD_OVR_VA: 20/

## 2018-07-11 ASSESSMENT — REFRACTION_AUTOREFRACTION
OD_SPHERE: PLANO
OS_SPHERE: +0.25
OS_AXIS: 053
OS_CYLINDER: -0.25
OD_CYLINDER: PLANO
OD_AXIS: 0

## 2018-08-20 ENCOUNTER — DOCTOR'S OFFICE (OUTPATIENT)
Dept: URBAN - METROPOLITAN AREA CLINIC 136 | Facility: CLINIC | Age: 63
Setting detail: OPHTHALMOLOGY
End: 2018-08-20
Payer: COMMERCIAL

## 2018-08-20 DIAGNOSIS — H40.013: ICD-10-CM

## 2018-08-20 DIAGNOSIS — Z96.1: ICD-10-CM

## 2018-08-20 DIAGNOSIS — H35.3112: ICD-10-CM

## 2018-08-20 DIAGNOSIS — H35.031: ICD-10-CM

## 2018-08-20 DIAGNOSIS — H43.811: ICD-10-CM

## 2018-08-20 PROBLEM — H43.813 POSTERIOR VITREOUS DETACHMENT; RIGHT EYE: Status: ACTIVE | Noted: 2018-08-20

## 2018-08-20 PROBLEM — H40.003 GLAUCOMA SUSPECT; BOTH EYES: Status: ACTIVE | Noted: 2017-03-22

## 2018-08-20 PROCEDURE — 92012 INTRM OPH EXAM EST PATIENT: CPT | Performed by: OPHTHALMOLOGY

## 2018-08-20 PROCEDURE — 92134 CPTRZ OPH DX IMG PST SGM RTA: CPT | Performed by: OPHTHALMOLOGY

## 2018-08-20 ASSESSMENT — VISUAL ACUITY
OS_BCVA: 20/20-1
OD_BCVA: 20/20-2

## 2018-08-20 ASSESSMENT — REFRACTION_MANIFEST
OD_VA3: 20/
OD_VA1: 20/
OS_CYLINDER: -0.50
OD_VA1: 20/20
OS_VA3: 20/
OD_VA3: 20/
OD_VA2: 20/
OU_VA: 20/
OS_VA3: 20/
OD_SPHERE: +2.50
OD_CYLINDER: -0.50
OS_VA1: 20/20
OS_VA1: 20/
OU_VA: 20/20
OS_VA2: 20/
OD_VA2: 20/
OS_SPHERE: +2.25
OS_AXIS: 060
OS_VA2: 20/
OD_AXIS: 160

## 2018-08-20 ASSESSMENT — REFRACTION_OUTSIDERX
OS_CYLINDER: -0.50
OD_ADD: +PLANO
OD_VA1: 20/20
OD_SPHERE: +0.25
OD_VA2: 20/20
OS_VA2: 20/20
OS_SPHERE: +0.25
OD_VA3: 20/
OD_CYLINDER: -0.50
OS_VA1: 20/20
OS_VA3: 20/
OU_VA: 20/20
OS_AXIS: 030
OD_AXIS: 165
OS_ADD: +PLANO

## 2018-08-20 ASSESSMENT — REFRACTION_CURRENTRX
OS_CYLINDER: -0.25
OD_OVR_VA: 20/
OD_ADD: +2.00
OD_AXIS: 071
OS_OVR_VA: 20/
OS_AXIS: 050
OS_OVR_VA: 20/
OD_CYLINDER: -0.25
OS_OVR_VA: 20/
OS_SPHERE: +1.50
OS_ADD: +2.00
OD_OVR_VA: 20/
OD_OVR_VA: 20/
OD_SPHERE: +1.75

## 2018-08-20 ASSESSMENT — TEAR BREAK UP TIME (TBUT)
OS_TBUT: 1+
OD_TBUT: 1+

## 2018-08-20 ASSESSMENT — SUPERFICIAL PUNCTATE KERATITIS (SPK)
OS_SPK: 1+
OD_SPK: T

## 2018-08-20 ASSESSMENT — SPHEQUIV_DERIVED
OD_SPHEQUIV: 2.25
OS_SPHEQUIV: 2
OS_SPHEQUIV: 0.125

## 2018-08-20 ASSESSMENT — REFRACTION_AUTOREFRACTION
OD_SPHERE: PLANO
OS_AXIS: 053
OD_CYLINDER: PLANO
OD_AXIS: 0
OS_CYLINDER: -0.25
OS_SPHERE: +0.25

## 2018-08-20 ASSESSMENT — DECREASING TEAR LAKE - SEVERITY SCORE
OS_DEC_TEARLAKE: 1+
OD_DEC_TEARLAKE: 1+

## 2018-08-20 ASSESSMENT — LID EXAM ASSESSMENTS
OD_MEIBOMITIS: 2+
OS_MEIBOMITIS: 2+

## 2018-08-20 ASSESSMENT — CONFRONTATIONAL VISUAL FIELD TEST (CVF)
OS_FINDINGS: FULL
OD_FINDINGS: FULL

## 2018-08-21 PROBLEM — H34.832: Status: ACTIVE | Noted: 2018-08-20
